# Patient Record
Sex: MALE | Race: WHITE | NOT HISPANIC OR LATINO | URBAN - METROPOLITAN AREA
[De-identification: names, ages, dates, MRNs, and addresses within clinical notes are randomized per-mention and may not be internally consistent; named-entity substitution may affect disease eponyms.]

---

## 2021-07-11 ENCOUNTER — EMERGENCY (EMERGENCY)
Facility: HOSPITAL | Age: 41
LOS: 1 days | Discharge: HOME | End: 2021-07-11
Attending: EMERGENCY MEDICINE | Admitting: EMERGENCY MEDICINE
Payer: COMMERCIAL

## 2021-07-11 VITALS
TEMPERATURE: 99 F | DIASTOLIC BLOOD PRESSURE: 79 MMHG | RESPIRATION RATE: 16 BRPM | OXYGEN SATURATION: 99 % | HEART RATE: 73 BPM | SYSTOLIC BLOOD PRESSURE: 129 MMHG | HEIGHT: 75.59 IN | WEIGHT: 194.01 LBS

## 2021-07-11 DIAGNOSIS — S61.451A OPEN BITE OF RIGHT HAND, INITIAL ENCOUNTER: ICD-10-CM

## 2021-07-11 DIAGNOSIS — L03.114 CELLULITIS OF LEFT UPPER LIMB: ICD-10-CM

## 2021-07-11 DIAGNOSIS — Z23 ENCOUNTER FOR IMMUNIZATION: ICD-10-CM

## 2021-07-11 DIAGNOSIS — S61.452A OPEN BITE OF LEFT HAND, INITIAL ENCOUNTER: ICD-10-CM
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
## 2021-07-11 DIAGNOSIS — Z88.5 ALLERGY STATUS TO NARCOTIC AGENT: ICD-10-CM

## 2021-07-11 DIAGNOSIS — L03.113 CELLULITIS OF RIGHT UPPER LIMB: ICD-10-CM

## 2021-07-11 DIAGNOSIS — M79.89 OTHER SPECIFIED SOFT TISSUE DISORDERS: ICD-10-CM
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
## 2021-07-11 LAB
ALBUMIN SERPL ELPH-MCNC: 4.6 G/DL — SIGNIFICANT CHANGE UP (ref 3.4–5)
ALP SERPL-CCNC: 78 U/L — SIGNIFICANT CHANGE UP (ref 40–120)
ALT FLD-CCNC: 34 U/L — SIGNIFICANT CHANGE UP (ref 12–42)
ANION GAP SERPL CALC-SCNC: 5 MMOL/L — LOW (ref 9–16)
AST SERPL-CCNC: 32 U/L — SIGNIFICANT CHANGE UP (ref 15–37)
BASOPHILS # BLD AUTO: 0.04 K/UL — SIGNIFICANT CHANGE UP (ref 0–0.2)
BASOPHILS NFR BLD AUTO: 0.4 % — SIGNIFICANT CHANGE UP (ref 0–2)
BILIRUB SERPL-MCNC: 0.4 MG/DL — SIGNIFICANT CHANGE UP (ref 0.2–1.2)
BUN SERPL-MCNC: 19 MG/DL — SIGNIFICANT CHANGE UP (ref 7–23)
CALCIUM SERPL-MCNC: 9.6 MG/DL — SIGNIFICANT CHANGE UP (ref 8.5–10.5)
CHLORIDE SERPL-SCNC: 106 MMOL/L — SIGNIFICANT CHANGE UP (ref 96–108)
CO2 SERPL-SCNC: 31 MMOL/L — SIGNIFICANT CHANGE UP (ref 22–31)
CREAT SERPL-MCNC: 1.3 MG/DL — SIGNIFICANT CHANGE UP (ref 0.5–1.3)
CRP SERPL-MCNC: 7 MG/L — SIGNIFICANT CHANGE UP (ref 0–9)
EOSINOPHIL # BLD AUTO: 0.1 K/UL — SIGNIFICANT CHANGE UP (ref 0–0.5)
EOSINOPHIL NFR BLD AUTO: 0.9 % — SIGNIFICANT CHANGE UP (ref 0–6)
GLUCOSE SERPL-MCNC: 104 MG/DL — HIGH (ref 70–99)
HCT VFR BLD CALC: 44.2 % — SIGNIFICANT CHANGE UP (ref 39–50)
HGB BLD-MCNC: 15 G/DL — SIGNIFICANT CHANGE UP (ref 13–17)
IMM GRANULOCYTES NFR BLD AUTO: 0.5 % — SIGNIFICANT CHANGE UP (ref 0–1.5)
LYMPHOCYTES # BLD AUTO: 1.43 K/UL — SIGNIFICANT CHANGE UP (ref 1–3.3)
LYMPHOCYTES # BLD AUTO: 13.5 % — SIGNIFICANT CHANGE UP (ref 13–44)
MCHC RBC-ENTMCNC: 29.7 PG — SIGNIFICANT CHANGE UP (ref 27–34)
MCHC RBC-ENTMCNC: 33.9 GM/DL — SIGNIFICANT CHANGE UP (ref 32–36)
MCV RBC AUTO: 87.5 FL — SIGNIFICANT CHANGE UP (ref 80–100)
MONOCYTES # BLD AUTO: 0.73 K/UL — SIGNIFICANT CHANGE UP (ref 0–0.9)
MONOCYTES NFR BLD AUTO: 6.9 % — SIGNIFICANT CHANGE UP (ref 2–14)
NEUTROPHILS # BLD AUTO: 8.24 K/UL — HIGH (ref 1.8–7.4)
NEUTROPHILS NFR BLD AUTO: 77.8 % — HIGH (ref 43–77)
NRBC # BLD: 0 /100 WBCS — SIGNIFICANT CHANGE UP (ref 0–0)
PLATELET # BLD AUTO: 192 K/UL — SIGNIFICANT CHANGE UP (ref 150–400)
POTASSIUM SERPL-MCNC: 4.3 MMOL/L — SIGNIFICANT CHANGE UP (ref 3.5–5.3)
POTASSIUM SERPL-SCNC: 4.3 MMOL/L — SIGNIFICANT CHANGE UP (ref 3.5–5.3)
PROT SERPL-MCNC: 7.9 G/DL — SIGNIFICANT CHANGE UP (ref 6.4–8.2)
RBC # BLD: 5.05 M/UL — SIGNIFICANT CHANGE UP (ref 4.2–5.8)
RBC # FLD: 12.6 % — SIGNIFICANT CHANGE UP (ref 10.3–14.5)
SODIUM SERPL-SCNC: 142 MMOL/L — SIGNIFICANT CHANGE UP (ref 132–145)
WBC # BLD: 10.59 K/UL — HIGH (ref 3.8–10.5)
WBC # FLD AUTO: 10.59 K/UL — HIGH (ref 3.8–10.5)

## 2021-07-11 PROCEDURE — 73130 X-RAY EXAM OF HAND: CPT | Mod: 26,50

## 2021-07-11 PROCEDURE — 99220: CPT

## 2021-07-11 RX ORDER — AMPICILLIN SODIUM AND SULBACTAM SODIUM 250; 125 MG/ML; MG/ML
3 INJECTION, POWDER, FOR SUSPENSION INTRAMUSCULAR; INTRAVENOUS ONCE
Refills: 0 | Status: COMPLETED | OUTPATIENT
Start: 2021-07-11 | End: 2021-07-11

## 2021-07-11 RX ORDER — ACETAMINOPHEN 500 MG
975 TABLET ORAL ONCE
Refills: 0 | Status: COMPLETED | OUTPATIENT
Start: 2021-07-11 | End: 2021-07-11

## 2021-07-11 RX ORDER — AMPICILLIN SODIUM AND SULBACTAM SODIUM 250; 125 MG/ML; MG/ML
3 INJECTION, POWDER, FOR SUSPENSION INTRAMUSCULAR; INTRAVENOUS EVERY 6 HOURS
Refills: 0 | Status: DISCONTINUED | OUTPATIENT
Start: 2021-07-12 | End: 2021-07-15

## 2021-07-11 RX ORDER — SODIUM CHLORIDE 9 MG/ML
1000 INJECTION INTRAMUSCULAR; INTRAVENOUS; SUBCUTANEOUS ONCE
Refills: 0 | Status: COMPLETED | OUTPATIENT
Start: 2021-07-11 | End: 2021-07-11

## 2021-07-11 RX ORDER — TETANUS TOXOID, REDUCED DIPHTHERIA TOXOID AND ACELLULAR PERTUSSIS VACCINE, ADSORBED 5; 2.5; 8; 8; 2.5 [IU]/.5ML; [IU]/.5ML; UG/.5ML; UG/.5ML; UG/.5ML
0.5 SUSPENSION INTRAMUSCULAR ONCE
Refills: 0 | Status: COMPLETED | OUTPATIENT
Start: 2021-07-11 | End: 2021-07-11

## 2021-07-11 RX ORDER — KETOROLAC TROMETHAMINE 30 MG/ML
15 SYRINGE (ML) INJECTION ONCE
Refills: 0 | Status: DISCONTINUED | OUTPATIENT
Start: 2021-07-11 | End: 2021-07-11

## 2021-07-11 RX ADMIN — Medication 975 MILLIGRAM(S): at 22:55

## 2021-07-11 RX ADMIN — Medication 15 MILLIGRAM(S): at 23:05

## 2021-07-11 RX ADMIN — Medication 975 MILLIGRAM(S): at 23:50

## 2021-07-11 RX ADMIN — AMPICILLIN SODIUM AND SULBACTAM SODIUM 200 GRAM(S): 250; 125 INJECTION, POWDER, FOR SUSPENSION INTRAMUSCULAR; INTRAVENOUS at 22:14

## 2021-07-11 RX ADMIN — SODIUM CHLORIDE 1000 MILLILITER(S): 9 INJECTION INTRAMUSCULAR; INTRAVENOUS; SUBCUTANEOUS at 23:15

## 2021-07-11 RX ADMIN — TETANUS TOXOID, REDUCED DIPHTHERIA TOXOID AND ACELLULAR PERTUSSIS VACCINE, ADSORBED 0.5 MILLILITER(S): 5; 2.5; 8; 8; 2.5 SUSPENSION INTRAMUSCULAR at 22:14

## 2021-07-11 RX ADMIN — SODIUM CHLORIDE 1000 MILLILITER(S): 9 INJECTION INTRAMUSCULAR; INTRAVENOUS; SUBCUTANEOUS at 22:15

## 2021-07-11 RX ADMIN — Medication 15 MILLIGRAM(S): at 22:14

## 2021-07-11 RX ADMIN — AMPICILLIN SODIUM AND SULBACTAM SODIUM 3 GRAM(S): 250; 125 INJECTION, POWDER, FOR SUSPENSION INTRAMUSCULAR; INTRAVENOUS at 22:44

## 2021-07-11 NOTE — ED CDU PROVIDER INITIAL DAY NOTE - LAB INTERPRETATION
Xray (wet reads) interpreted by JANETH BOWENS   xray b/l hands 3 views - +soft tissue swelling. no acute fx or dislocation, joint space intact, no effusion noted. No foreign body noted

## 2021-07-11 NOTE — ED PROVIDER NOTE - NOTES
dog bites to b/l dorsum hands near joint spaces, FROM, NV intact, and no s/s of tenosynovitis currently, will see pt in office for close outpt follow up.  serial IV abx in the ED

## 2021-07-11 NOTE — ED CDU PROVIDER INITIAL DAY NOTE - ATTENDING CONTRIBUTION TO CARE
Pt presents S/P dog bites to dorsum of both hands and worsening erythema, swelling, pain. fever on VS. no areas that need drainage. cellulitis. Will Obs for IV abxs several rounds.

## 2021-07-11 NOTE — ED PROVIDER NOTE - CLINICAL SUMMARY MEDICAL DECISION MAKING FREE TEXT BOX
pt p/w multiple dog bites to the dorsum of b/l hands near joint spaces, +fever at home, non toxic appearing, hemodynamically stable and wounds already healing with no focal palpable fluctuance for drainage, no streaking and compartments soft, NV intact on exam, xrays with no retained FB or gas patterns, hand consulted, case discussed with Dr. Mejía, will keep pt in obs for serial NV checks and IV abx

## 2021-07-11 NOTE — ED ADULT TRIAGE NOTE - CHIEF COMPLAINT QUOTE
bilateral dog bites to dorsum of hands- multiple puncture sites appreciated with erythema/warmth redness endorsed fever at home  38.5C- dog is fully  vaccinated

## 2021-07-11 NOTE — ED PROVIDER NOTE - OBJECTIVE STATEMENT
40 yo M with no known PMHx, RHD, last tetanus unknown, presenting c/o b/l hand swelling, pain, and fever s/p dog bites yesterday. Pt reports walking his friend's dogs yesterday, one's leash was loose and ran away.  Pt caught him but sustained multiple bites to b/l dorsum hands.  Rinsed wounds immediately at home but noted progressive worsening swelling, pain, redness, and temp of 38.5 at today.  Took APAP this evening with mild improvement but reports feeling malaise.  Dog is UTD with vaccination.  Denies chills, FB sensation, change in ROM/sensation, d/c, bleeding, paresthesia, purulent d/c, N/V, HA, dizziness, LOC, CP, SOB, and focal weakness

## 2021-07-11 NOTE — ED CDU PROVIDER INITIAL DAY NOTE - PROGRESS NOTE DETAILS
b/l with strict elevation of BUE currently, no drainable collections noted on exam, wounds already healing via secondary intention, currently with IV unasyn infusion, Dr. Mejía aware, agrees with plan for serial IV abx, monitor progression of wound and likely outpt close follow up seen by Dr. Mejía at bedside, no drainable fluctuance noted, agrees with current plan, serial IV abx, and dc home with close outpt f/u for wound checks.  Will continue with current management, limb elevation, and 2nd dose of unasyn at 4am

## 2021-07-11 NOTE — ED ADULT NURSE NOTE - OBJECTIVE STATEMENT
Pt c/o bites to b/l hands by friends dog. Multiple bite marks noted. Erythema, and warmth to area noted, pt also endorses fever at home. Positive distal pulses and capillary refill.

## 2021-07-11 NOTE — ED PROVIDER NOTE - PHYSICAL EXAMINATION
Vital Signs - nursing notes reviewed and confirmed  Gen - WDWN M, NAD, comfortable and non-toxic appearing, speaking in full sentences   Skin - warm, dry, see MSK section   HEENT - AT/NC, PERRL, EOMI, no conjunctival injection, moist oral mucosa, o/p clear with no erythema, edema, or exudate, uvula midline, airway patent, neck supple and NT, FROM, no palpable nodes   CV - S1S2, R/R/R  Resp - respiration non-labored, CTAB, symmetric bs b/l, no r/r/w  GI - NABS, soft, ND, NT, no rebound or guarding, no CVAT b/l   MS - w/w/p, no c/c/e, multiple bite healing wounds to b/l dorsum hands (R 1st webspace, 2nd MCP, 3rd MCP, L 2nd MCP), with distal pulses symmetric b/l, brisk cap refills, +SILT  Neuro - AxOx3, no focal neuro deficits, CN II-XII grossly intact, cerebellar function intact, negative pronator drift, negative nystagmus, ambulatory without gait disturbance

## 2021-07-12 VITALS
OXYGEN SATURATION: 98 % | TEMPERATURE: 98 F | RESPIRATION RATE: 16 BRPM | DIASTOLIC BLOOD PRESSURE: 54 MMHG | HEART RATE: 52 BPM | SYSTOLIC BLOOD PRESSURE: 101 MMHG

## 2021-07-12 PROCEDURE — 99217: CPT

## 2021-07-12 RX ORDER — IBUPROFEN 200 MG
1 TABLET ORAL
Qty: 20 | Refills: 0
Start: 2021-07-12

## 2021-07-12 RX ADMIN — AMPICILLIN SODIUM AND SULBACTAM SODIUM 200 GRAM(S): 250; 125 INJECTION, POWDER, FOR SUSPENSION INTRAMUSCULAR; INTRAVENOUS at 03:24

## 2021-07-12 NOTE — ED CDU PROVIDER DISPOSITION NOTE - CLINICAL COURSE
42 yo M with no known PMHx, RHD, last tetanus unknown, presenting c/o b/l hand swelling, pain, and fever s/p dog bites yesterday. Pt reports walking his friend's dogs yesterday, one's leash was loose and ran away.  Pt caught him but sustained multiple bites to b/l dorsum hands.  Rinsed wounds immediately at home but noted progressive worsening swelling, pain, redness, and temp of 38.5 at today.  Took APAP this evening with mild improvement but reports feeling malaise.  Dog is UTD with vaccination.  Denies chills, FB sensation, change in ROM/sensation, d/c, bleeding, paresthesia, purulent d/c, N/V, HA, dizziness, LOC, CP, SOB, and focal weakness. Pt is well appearing, hemodynamically stable and wounds already healing with no focal palpable fluctuance for drainage, no streaking and compartments soft, NV intact on exam, xrays with no retained FB or gas patterns, hand consulted, case discussed with Dr. Mejía, seen by hand at bedside, no drainable sites noted, strict elevation in the ED with BUE, s/p 2 doses of IV unasyn, serial NV and compartment exam wnl, no worsening s/s of cellulitis or progression to joint spaces, FROM on repeat exams, will dc home on course of augmentin and NSAID, prompt f/u with Dr. Mejía in 2d, strict return precautions discussed, pt verbalized understanding

## 2021-07-12 NOTE — ED CDU PROVIDER DISPOSITION NOTE - CARE PROVIDER_API CALL
Sae Mejía)  Plastic Surgery  [address], [address]  [place] [address]  Phone: [phone]  Fax: [phone]  Follow Up Time:

## 2021-07-12 NOTE — ED CDU PROVIDER DISPOSITION NOTE - PATIENT PORTAL LINK FT
You can access the FollowMyHealth Patient Portal offered by [facility] by registering at the following website: [email or web address]. By joining [employer]’s FollowMyHealth portal, you will also be able to view your health information using other applications (apps) compatible with our system.

## 2021-07-12 NOTE — ED ADULT NURSE REASSESSMENT NOTE - NS ED NURSE REASSESS COMMENT FT1
Assumed care from MYLA Myrick, pt in nad, call light within reach, side rails up x 2- pt apprised of plan of care: awaiting 345am 2nd dose of unasyn.

## 2021-07-12 NOTE — ED CDU PROVIDER DISPOSITION NOTE - NSFOLLOWUPINSTRUCTIONS_ED_ALL_ED_FT
Cellulitis    Cellulitis is a skin infection caused by bacteria. This condition occurs most often in the arms and lower legs but can occur anywhere over the body. Symptoms include redness, swelling, warm skin, tenderness, and chills/fever. If you were prescribed an antibiotic medicine, take it as told by your health care provider. Do not stop taking the antibiotic even if you start to feel better.    SEEK IMMEDIATE MEDICAL CARE IF YOU HAVE ANY OF THE FOLLOWING SYMPTOMS: worsening fever, red streaks coming from affected area, vomiting or diarrhea, or dizziness/lightheadedness.     Animal Bite    Animal bites can range from mild to serious. An animal bite can result in a scratch on the skin, a deep open cut, a puncture of the skin, a crush injury, or tearing away of the skin or a body part. Treatment includes wound care, updating your tetanus shot, and in some cases, administering a rabies vaccine. If you were prescribed an antibiotic, take it as told by your health care provider. Do not stop using the antibiotic even if your condition improves.      SEEK IMMEDIATE MEDICAL CARE IF YOU HAVE ANY OF THE FOLLOWING SYMPTOMS: red streaking away from the wound, fluid/blood/pus coming from the wound, fever or chills, trouble moving the injured area, numbness or tingling extending beyond the wound.

## 2021-07-17 LAB
CULTURE RESULTS: SIGNIFICANT CHANGE UP
CULTURE RESULTS: SIGNIFICANT CHANGE UP
SPECIMEN SOURCE: SIGNIFICANT CHANGE UP
SPECIMEN SOURCE: SIGNIFICANT CHANGE UP